# Patient Record
Sex: MALE | Race: WHITE | NOT HISPANIC OR LATINO | Employment: FULL TIME | URBAN - METROPOLITAN AREA
[De-identification: names, ages, dates, MRNs, and addresses within clinical notes are randomized per-mention and may not be internally consistent; named-entity substitution may affect disease eponyms.]

---

## 2018-01-13 NOTE — RESULT NOTES
Message  Stool test came back as negative      LMOM for pt to call the office for results  thanks CF1    LMOM x2 for pt to call the office  thanks CF2    pt aware of results   thanks CF3        1 Amended By: Tavon Darling; Jan 21 2016 3:14 PM EST   2 Amended By: Tavon Darling; Jan 22 2016 12:48 PM EST   3 Amended By: Tavon Darling; Jan 26 2016 8:03 AM EST    Signatures  Electronically signed by : Marylene Alstrom, M D ; Jan 21 2016  3:11PM EST                       (Author)

## 2018-05-22 ENCOUNTER — APPOINTMENT (EMERGENCY)
Dept: RADIOLOGY | Facility: HOSPITAL | Age: 28
End: 2018-05-22
Payer: COMMERCIAL

## 2018-05-22 ENCOUNTER — APPOINTMENT (EMERGENCY)
Dept: CT IMAGING | Facility: HOSPITAL | Age: 28
End: 2018-05-22
Payer: COMMERCIAL

## 2018-05-22 ENCOUNTER — HOSPITAL ENCOUNTER (EMERGENCY)
Facility: HOSPITAL | Age: 28
Discharge: HOME | End: 2018-05-22
Attending: EMERGENCY MEDICINE | Admitting: EMERGENCY MEDICINE
Payer: COMMERCIAL

## 2018-05-22 VITALS
RESPIRATION RATE: 18 BRPM | WEIGHT: 224.21 LBS | OXYGEN SATURATION: 98 % | HEIGHT: 72 IN | SYSTOLIC BLOOD PRESSURE: 109 MMHG | HEART RATE: 70 BPM | BODY MASS INDEX: 30.37 KG/M2 | TEMPERATURE: 98.6 F | DIASTOLIC BLOOD PRESSURE: 71 MMHG

## 2018-05-22 DIAGNOSIS — R20.2 PARESTHESIA: ICD-10-CM

## 2018-05-22 DIAGNOSIS — F41.9 ANXIETY: ICD-10-CM

## 2018-05-22 DIAGNOSIS — R07.9 CHEST PAIN: Primary | ICD-10-CM

## 2018-05-22 DIAGNOSIS — R79.89 ELEVATED TSH: ICD-10-CM

## 2018-05-22 LAB
ALBUMIN SERPL BCP-MCNC: 4.2 G/DL (ref 3.5–5)
ALP SERPL-CCNC: 81 U/L (ref 46–116)
ALT SERPL W P-5'-P-CCNC: 48 U/L (ref 12–78)
ANION GAP SERPL CALCULATED.3IONS-SCNC: 11 MMOL/L (ref 4–13)
AST SERPL W P-5'-P-CCNC: 22 U/L (ref 5–45)
ATRIAL RATE: 64 BPM
ATRIAL RATE: 87 BPM
BASOPHILS # BLD AUTO: 0.03 THOUSANDS/ΜL (ref 0–0.1)
BASOPHILS NFR BLD AUTO: 0 % (ref 0–1)
BILIRUB SERPL-MCNC: 0.5 MG/DL (ref 0.2–1)
BUN SERPL-MCNC: 13 MG/DL (ref 5–25)
CALCIUM SERPL-MCNC: 9 MG/DL (ref 8.3–10.1)
CHLORIDE SERPL-SCNC: 102 MMOL/L (ref 100–108)
CK SERPL-CCNC: 103 U/L (ref 39–308)
CO2 SERPL-SCNC: 28 MMOL/L (ref 21–32)
CREAT SERPL-MCNC: 0.92 MG/DL (ref 0.6–1.3)
DEPRECATED D DIMER PPP: <270 NG/ML (FEU) (ref 0–424)
EOSINOPHIL # BLD AUTO: 0.07 THOUSAND/ΜL (ref 0–0.61)
EOSINOPHIL NFR BLD AUTO: 1 % (ref 0–6)
ERYTHROCYTE [DISTWIDTH] IN BLOOD BY AUTOMATED COUNT: 12.6 % (ref 11.6–15.1)
GFR SERPL CREATININE-BSD FRML MDRD: 112 ML/MIN/1.73SQ M
GLUCOSE SERPL-MCNC: 98 MG/DL (ref 65–140)
HCT VFR BLD AUTO: 43.9 % (ref 36.5–49.3)
HGB BLD-MCNC: 15.2 G/DL (ref 12–17)
LYMPHOCYTES # BLD AUTO: 2.74 THOUSANDS/ΜL (ref 0.6–4.47)
LYMPHOCYTES NFR BLD AUTO: 33 % (ref 14–44)
MCH RBC QN AUTO: 29.6 PG (ref 26.8–34.3)
MCHC RBC AUTO-ENTMCNC: 34.6 G/DL (ref 31.4–37.4)
MCV RBC AUTO: 86 FL (ref 82–98)
MONOCYTES # BLD AUTO: 0.54 THOUSAND/ΜL (ref 0.17–1.22)
MONOCYTES NFR BLD AUTO: 7 % (ref 4–12)
NEUTROPHILS # BLD AUTO: 4.95 THOUSANDS/ΜL (ref 1.85–7.62)
NEUTS SEG NFR BLD AUTO: 59 % (ref 43–75)
P AXIS: 66 DEGREES
P AXIS: 72 DEGREES
PLATELET # BLD AUTO: 300 THOUSANDS/UL (ref 149–390)
PMV BLD AUTO: 9.1 FL (ref 8.9–12.7)
POTASSIUM SERPL-SCNC: 3.5 MMOL/L (ref 3.5–5.3)
PR INTERVAL: 170 MS
PR INTERVAL: 186 MS
PROT SERPL-MCNC: 8 G/DL (ref 6.4–8.2)
QRS AXIS: 106 DEGREES
QRS AXIS: 109 DEGREES
QRSD INTERVAL: 94 MS
QRSD INTERVAL: 96 MS
QT INTERVAL: 354 MS
QT INTERVAL: 398 MS
QTC INTERVAL: 401 MS
QTC INTERVAL: 409 MS
RBC # BLD AUTO: 5.13 MILLION/UL (ref 3.88–5.62)
SODIUM SERPL-SCNC: 141 MMOL/L (ref 136–145)
T WAVE AXIS: 56 DEGREES
T WAVE AXIS: 59 DEGREES
TROPONIN I SERPL-MCNC: <0.02 NG/ML
TROPONIN I SERPL-MCNC: <0.02 NG/ML
TSH SERPL DL<=0.05 MIU/L-ACNC: 7.78 UIU/ML (ref 0.36–3.74)
VENTRICULAR RATE: 61 BPM
VENTRICULAR RATE: 80 BPM
WBC # BLD AUTO: 8.33 THOUSAND/UL (ref 4.31–10.16)

## 2018-05-22 PROCEDURE — 84443 ASSAY THYROID STIM HORMONE: CPT | Performed by: EMERGENCY MEDICINE

## 2018-05-22 PROCEDURE — 85379 FIBRIN DEGRADATION QUANT: CPT | Performed by: EMERGENCY MEDICINE

## 2018-05-22 PROCEDURE — 36415 COLL VENOUS BLD VENIPUNCTURE: CPT | Performed by: EMERGENCY MEDICINE

## 2018-05-22 PROCEDURE — 96374 THER/PROPH/DIAG INJ IV PUSH: CPT

## 2018-05-22 PROCEDURE — 84484 ASSAY OF TROPONIN QUANT: CPT | Performed by: EMERGENCY MEDICINE

## 2018-05-22 PROCEDURE — 70450 CT HEAD/BRAIN W/O DYE: CPT

## 2018-05-22 PROCEDURE — 93005 ELECTROCARDIOGRAM TRACING: CPT

## 2018-05-22 PROCEDURE — 82550 ASSAY OF CK (CPK): CPT | Performed by: EMERGENCY MEDICINE

## 2018-05-22 PROCEDURE — 99285 EMERGENCY DEPT VISIT HI MDM: CPT

## 2018-05-22 PROCEDURE — 80053 COMPREHEN METABOLIC PANEL: CPT | Performed by: EMERGENCY MEDICINE

## 2018-05-22 PROCEDURE — 85025 COMPLETE CBC W/AUTO DIFF WBC: CPT | Performed by: EMERGENCY MEDICINE

## 2018-05-22 PROCEDURE — 71046 X-RAY EXAM CHEST 2 VIEWS: CPT

## 2018-05-22 PROCEDURE — 93010 ELECTROCARDIOGRAM REPORT: CPT | Performed by: INTERNAL MEDICINE

## 2018-05-22 RX ORDER — ONDANSETRON 2 MG/ML
4 INJECTION INTRAMUSCULAR; INTRAVENOUS ONCE
Status: DISCONTINUED | OUTPATIENT
Start: 2018-05-22 | End: 2018-05-22 | Stop reason: HOSPADM

## 2018-05-22 RX ORDER — LORAZEPAM 2 MG/ML
0.5 INJECTION INTRAMUSCULAR ONCE
Status: DISCONTINUED | OUTPATIENT
Start: 2018-05-22 | End: 2018-05-22

## 2018-05-22 RX ORDER — KETOROLAC TROMETHAMINE 30 MG/ML
30 INJECTION, SOLUTION INTRAMUSCULAR; INTRAVENOUS ONCE
Status: COMPLETED | OUTPATIENT
Start: 2018-05-22 | End: 2018-05-22

## 2018-05-22 RX ORDER — ASPIRIN 81 MG/1
162 TABLET, CHEWABLE ORAL ONCE
Status: COMPLETED | OUTPATIENT
Start: 2018-05-22 | End: 2018-05-22

## 2018-05-22 RX ADMIN — KETOROLAC TROMETHAMINE 30 MG: 30 INJECTION, SOLUTION INTRAMUSCULAR at 02:06

## 2018-05-22 RX ADMIN — ASPIRIN 81 MG 162 MG: 81 TABLET ORAL at 02:06

## 2018-05-22 NOTE — ED NOTES
Reviewed discharge instructions, f/u apt information and indication to return to emergency care  Patient verbalized understanding and offered no further questions  Alert and in stable condition  Ambulated off unit accompanied by friend with steady gait and in no acute distress        Dominique Reddy RN  05/22/18 [id number]

## 2018-05-22 NOTE — DISCHARGE INSTRUCTIONS
Anxiety   WHAT YOU NEED TO KNOW:   Anxiety is a condition that causes you to feel extremely worried or nervous  The feelings are so strong that they can cause problems with your daily activities or sleep  Anxiety may be triggered by something you fear, or it may happen without a cause  Family or work stress, smoking, caffeine, and alcohol can increase your risk for anxiety  Certain medicines or health conditions can also increase your risk  Anxiety can become a long-term condition if it is not managed or treated  DISCHARGE INSTRUCTIONS:   Call 911 if:   · You have chest pain, tightness, or heaviness that may spread to your shoulders, arms, jaw, neck, or back  · You feel like hurting yourself or someone else  Contact your healthcare provider if:   · Your symptoms get worse or do not get better with treatment  · Your anxiety keeps you from doing your regular daily activities  · You have new symptoms since your last visit  · You have questions or concerns about your condition or care  Medicines:   · Medicines  may be given to help you feel more calm and relaxed, and decrease your symptoms  · Take your medicine as directed  Contact your healthcare provider if you think your medicine is not helping or if you have side effects  Tell him of her if you are allergic to any medicine  Keep a list of the medicines, vitamins, and herbs you take  Include the amounts, and when and why you take them  Bring the list or the pill bottles to follow-up visits  Carry your medicine list with you in case of an emergency  Follow up with your healthcare provider within 2 weeks or as directed:  Write down your questions so you remember to ask them during your visits  Manage anxiety:   · Talk to someone about your anxiety  Your healthcare provider may suggest counseling  Cognitive behavioral therapy can help you understand and change how you react to events that trigger your symptoms   You might feel more comfortable talking with a friend or family member about your anxiety  Choose someone you know will be supportive and encouraging  · Find ways to relax  Activities such as exercise, meditation, or listening to music can help you relax  Spend time with friends, or do things you enjoy  · Practice deep breathing  Deep breathing can help you relax when you feel anxious  Focus on taking slow, deep breaths several times a day, or during an anxiety attack  Breathe in through your nose and out through your mouth  · Create a regular sleep routine  Regular sleep can help you feel calmer during the day  Go to sleep and wake up at the same times every day  Do not watch television or use the computer right before bed  Your room should be comfortable, dark, and quiet  · Eat a variety of healthy foods  Healthy foods include fruits, vegetables, low-fat dairy products, lean meats, fish, whole-grain breads, and cooked beans  Healthy foods can help you feel less anxious and have more energy  · Exercise regularly  Exercise can increase your energy level  Exercise may also lift your mood and help you sleep better  Your healthcare provider can help you create an exercise plan  · Do not smoke  Nicotine and other chemicals in cigarettes and cigars can increase anxiety  Ask your healthcare provider for information if you currently smoke and need help to quit  E-cigarettes or smokeless tobacco still contain nicotine  Talk to your healthcare provider before you use these products  · Do not have caffeine  Caffeine can make your symptoms worse  Do not have foods or drinks that are meant to increase your energy level  · Limit or do not drink alcohol  Ask your healthcare provider if alcohol is safe for you  You may not be able to drink alcohol if you take certain anxiety or depression medicines  Limit alcohol to 1 drink per day if you are a woman  Limit alcohol to 2 drinks per day if you are a man   A drink of alcohol is 12 ounces of beer, 5 ounces of wine, or 1½ ounces of liquor  · Do not use drugs  Drugs can make your anxiety worse  It can also make anxiety hard to manage  Talk to your healthcare provider if you use drugs and want help to quit  © 2017 [employer] Information is for End User's use only and may not be sold, redistributed or otherwise used for commercial purposes  All illustrations and images included in CareNotes® are the copyrighted property of [employer]  or [employer]  The above information is an  only  It is not intended as medical advice for individual conditions or treatments  Talk to your doctor, nurse or pharmacist before following any medical regimen to see if it is safe and effective for you  Chest Pain   WHAT YOU NEED TO KNOW:   Chest pain can be caused by a range of conditions, from not serious to life-threatening  Chest pain can be a symptom of a digestive problem, such as acid reflux or a stomach ulcer  An anxiety attack or a strong emotion, such as anger, can also cause chest pain  Infection, inflammation, or a fracture in the bones or cartilage in your chest can cause pain or discomfort  Sometimes chest pain or pressure is caused by poor blood flow to your heart (angina)  Chest pain may also be caused by life-threatening conditions such as a heart attack or blood clot in your lungs  DISCHARGE INSTRUCTIONS:   Call 911 if:   · You have any of the following signs of a heart attack:      ¨ Squeezing, pressure, or pain in your chest that lasts longer than 5 minutes or returns    ¨ Discomfort or pain in your back, neck, jaw, stomach, or arm     ¨ Trouble breathing    ¨ Nausea or vomiting    ¨ Lightheadedness or a sudden cold sweat, especially with chest pain or trouble breathing    Return to the emergency department if:   · You have chest discomfort that gets worse, even with medicine  · You cough or vomit blood       · Your bowel movements are black or bloody  · You cannot stop vomiting, or it hurts to swallow  Contact your healthcare provider if:   · You have questions or concerns about your condition or care  Medicines:   · Medicines  may be given to treat the cause of your chest pain  Examples include pain medicine, anxiety medicine, or medicines to increase blood flow to your heart  · Do not take certain medicines without asking your healthcare provider first   These include NSAIDs, herbal or vitamin supplements, or hormones (estrogen or progestin)  · Take your medicine as directed  Contact your healthcare provider if you think your medicine is not helping or if you have side effects  Tell him or her if you are allergic to any medicine  Keep a list of the medicines, vitamins, and herbs you take  Include the amounts, and when and why you take them  Bring the list or the pill bottles to follow-up visits  Carry your medicine list with you in case of an emergency  Follow up with your healthcare provider within 72 hours, or as directed: You may need to return for more tests to find the cause of your chest pain  You may be referred to a specialist, such as a cardiologist or gastroenterologist  Write down your questions so you remember to ask them during your visits  Healthy living tips: The following are general healthy guidelines  If your chest pain is caused by a heart problem, your healthcare provider will give you specific guidelines to follow  · Do not smoke  Nicotine and other chemicals in cigarettes and cigars can cause lung and heart damage  Ask your healthcare provider for information if you currently smoke and need help to quit  E-cigarettes or smokeless tobacco still contain nicotine  Talk to your healthcare provider before you use these products  · Eat a variety of healthy, low-fat foods  Healthy foods include fruits, vegetables, whole-grain breads, low-fat dairy products, beans, lean meats, and fish   Ask for more information about a heart healthy diet  · Ask about activity  Your healthcare provider will tell you which activities to limit or avoid  Ask when you can drive, return to work, and have sex  Ask about the best exercise plan for you  · Maintain a healthy weight  Ask your healthcare provider how much you should weigh  Ask him or her to help you create a weight loss plan if you are overweight  · Get the flu and pneumonia vaccines  All adults should get the influenza (flu) vaccine  Get it every year as soon as it becomes available  The pneumococcal vaccine is given to adults aged 72 years or older  The vaccine is given every 5 years to prevent pneumococcal disease, such as pneumonia  © 2017 [employer]  Information is for End User's use only and may not be sold, redistributed or otherwise used for commercial purposes  All illustrations and images included in CareNotes® are the copyrighted property of [employer]  or [employer]  The above information is an  only  It is not intended as medical advice for individual conditions or treatments  Talk to your doctor, nurse or pharmacist before following any medical regimen to see if it is safe and effective for you  Paresthesia   WHAT YOU NEED TO KNOW:   Paresthesia is numbness and tingling  It can happen in any part of your body, but usually occurs in your legs, feet, arms, or hands  There are a large number of conditions that can cause paresthesia  It affects the nerves that provide sensation and happens when there are changes in nerves or nerve pathways  These changes can be temporary, such as if you take certain medicines or you are not getting enough vitamin B  Paresthesia can become permanent when there is nerve damage  Conditions that may cause nerve damage include diabetes, carpel tunnel syndrome, stroke, and multiple sclerosis  The exact cause of your paresthesia may be unknown    DISCHARGE INSTRUCTIONS: Medicines:  Ask for more information about medicines you may need to treat the condition causing your paresthesias  · NSAIDs  help decrease swelling and pain or fever  This medicine is available with or without a doctor's order  NSAIDs can cause stomach bleeding or kidney problems in certain people  If you take blood thinner medicine, always ask your healthcare provider if NSAIDs are safe for you  Always read the medicine label and follow directions  · Take your medicine as directed  Contact your healthcare provider if you think your medicine is not helping or if you have side effects  Tell him or her if you are allergic to any medicine  Keep a list of the medicines, vitamins, and herbs you take  Include the amounts, and when and why you take them  Bring the list or the pill bottles to follow-up visits  Carry your medicine list with you in case of an emergency  Follow up with your healthcare provider or neurologist as directed:  Write down your questions so you remember to ask them during your visits  Contact your healthcare provider or neurologist if:   · Your symptoms do not improve  · You have symptoms in more than one part of your body  · You have questions about your condition or care  Return to the emergency department if:   · You have any of the following signs of a stroke:      ¨ Numbness or drooping on one side of your face     ¨ Weakness in an arm or leg    ¨ Confusion or difficulty speaking    ¨ Dizziness, a severe headache, or vision loss    · You are not able to control your urine or bowel movements  · You have severe pain along with numbness and tingling  · Your legs suddenly become cold  You have trouble moving your legs, and they ache  · You have increased weakness in a part of your body  · You have uncontrolled movements, or you have a seizure    © 2017 [employer] Information is for End User's use only and may not be sold, redistributed or otherwise used for commercial purposes  All illustrations and images included in CareNotes® are the copyrighted property of [employer]  or [employer]  The above information is an  only  It is not intended as medical advice for individual conditions or treatments  Talk to your doctor, nurse or pharmacist before following any medical regimen to see if it is safe and effective for you

## 2018-05-22 NOTE — ED PROVIDER NOTES
History  Chief Complaint   Patient presents with    Chest Pain     Patient reports hx chest pain "for 2 years  " with hx  anxiety  Over last 4 days reports new onset left arm numbness and periodic right sided neck pain/numbness  Chest pain at present over left upper chest radiating down left arm  Reports mild SOB, not worsening with exertion  Patient is a 32year old male with 4 days of intermittent worsening left sided chest pain with radiation to left arm  (+) headache and neck pain with numbness of left arm and right side of neck intermittently  Has anxiety  (+) nausea and no vomiting  (+) mild sob  Not pleuritic  Has a 3 hour car ride every month to go camping  No early CAD in family  Has smoked on and off for past 10 years and quit 4 months ago  Denies illicit drug use including cocaine  No recent old records from this ED seen on computer system  [facility] website checked on this patient and last Rxs filled were on 4/28/18 for adderall ER and valium both for 30 day supply  (+) dizziness  No vertigo  Patient states he is worried about a stroke  No cough  Has had intermittent chest pain for past 2 years from anxiety  Patient drove here but friend can drive home  History provided by:  Patient and friend   used: No        None       Past Medical History:   Diagnosis Date    Anxiety        Past Surgical History:   Procedure Laterality Date    COLONOSCOPY N/A 3/18/2016    Procedure: COLONOSCOPY;  Surgeon: Ofelia Ramires MD;  Location: [facility] GI LAB; Service:     ESOPHAGOGASTRODUODENOSCOPY N/A 3/18/2016    Procedure: ESOPHAGOGASTRODUODENOSCOPY (EGD); Surgeon: Ofelia Ramires MD;  Location: [facility] GI LAB; Service:        History reviewed  No pertinent family history  I have reviewed and agree with the history as documented      Social History   Substance Use Topics    Smoking status: Former Smoker     Packs/day: 0 50     Quit date: 3/18/2012    Smokeless tobacco: Never Used  Alcohol use 0 6 oz/week     1 Cans of beer per week        Review of Systems   Constitutional: Negative for fever  Respiratory: Positive for shortness of breath  Negative for cough  Cardiovascular: Positive for chest pain  Gastrointestinal: Positive for nausea  Negative for vomiting  Musculoskeletal: Positive for myalgias and neck pain  Neurological: Positive for dizziness, numbness and headaches  Psychiatric/Behavioral: The patient is nervous/anxious  All other systems reviewed and are negative  Physical Exam  Physical Exam   Constitutional: He is oriented to person, place, and time  He appears well-developed and well-nourished  He appears distressed (moderate)  HENT:   Head: Normocephalic and atraumatic  Mouth/Throat: Oropharynx is clear and moist    Eyes: EOM are normal  Pupils are equal, round, and reactive to light  No scleral icterus  Neck: Normal range of motion  Neck supple  No JVD present  No tracheal deviation present  Cardiovascular: Normal rate, regular rhythm and normal heart sounds  No murmur heard  Pulmonary/Chest: Effort normal and breath sounds normal  No stridor  No respiratory distress  He has no wheezes  He has no rales  He exhibits no tenderness  Abdominal: Soft  Bowel sounds are normal  He exhibits no distension  There is no tenderness  Musculoskeletal: He exhibits no edema, tenderness (No calf tenderness) or deformity  Neurological: He is alert and oriented to person, place, and time  No cranial nerve deficit or sensory deficit  Normal strength  No focal deficits   Skin: Skin is warm and dry  No rash noted  Psychiatric:   Anxious  Nursing note and vitals reviewed        Vital Signs  ED Triage Vitals   Temperature Pulse Respirations Blood Pressure SpO2   05/22/18 0115 05/22/18 0045 05/22/18 0045 05/22/18 0045 05/22/18 0045   98 6 °F (37 °C) 82 20 114/77 100 %      Temp Source Heart Rate Source Patient Position - Orthostatic VS BP Location FiO2 (%)   05/22/18 0115 05/22/18 0145 05/22/18 0045 05/22/18 0045 --   Oral Monitor Sitting Right arm       Pain Score       05/22/18 0045       4           Vitals:    05/22/18 0045 05/22/18 0145 05/22/18 0330   BP: 114/77 110/75 108/66   Pulse: 82 74 62   Patient Position - Orthostatic VS: Sitting Sitting Sitting       Visual Acuity      ED Medications  Medications   ondansetron (ZOFRAN) injection 4 mg (4 mg Intravenous Not Given 5/22/18 0115)   aspirin chewable tablet 162 mg (162 mg Oral Given 5/22/18 0206)   ketorolac (TORADOL) injection 30 mg (30 mg Intravenous Given 5/22/18 0206)       Diagnostic Studies  Results Reviewed     Procedure Component Value Units Date/Time    Troponin I [[id number]]  (Normal) Collected:  05/22/18 0307    Lab Status:  Final result Specimen:  Blood from Arm, Right Updated:  05/22/18 0332     Troponin I <0 02 ng/mL     Narrative:         Siemens Chemistry analyzer 99% cutoff is > 0 04 ng/mL in network labs    o cTnI 99% cutoff is useful only when applied to patients in the clinical setting of myocardial ischemia  o cTnI 99% cutoff should be interpreted in the context of clinical history, ECG findings and possibly cardiac imaging to establish correct diagnosis  o cTnI 99% cutoff may be suggestive but clearly not indicative of a coronary event without the clinical setting of myocardial ischemia  TSH [[id number]]  (Abnormal) Collected:  05/22/18 0104    Lab Status:  Final result Specimen:  Blood from Arm, Right Updated:  05/22/18 0135     TSH 3RD GENERATON 7 775 (H) uIU/mL     Narrative:         Patients undergoing fluorescein dye angiography may retain small amounts of fluorescein in the body for 48-72 hours post procedure  Samples containing fluorescein can produce falsely depressed TSH values  If the patient had this procedure,a specimen should be resubmitted post fluorescein clearance      CK Total with Reflex CKMB [[id number]]  (Normal) Collected:  05/22/18 0104    Lab Status:  Final result Specimen:  Blood from Arm, Right Updated:  05/22/18 0135     Total  U/L     Troponin I [[id number]]  (Normal) Collected:  05/22/18 0104    Lab Status:  Final result Specimen:  Blood from Arm, Right Updated:  05/22/18 0128     Troponin I <0 02 ng/mL     Narrative:         Siemens Chemistry analyzer 99% cutoff is > 0 04 ng/mL in network labs    o cTnI 99% cutoff is useful only when applied to patients in the clinical setting of myocardial ischemia  o cTnI 99% cutoff should be interpreted in the context of clinical history, ECG findings and possibly cardiac imaging to establish correct diagnosis  o cTnI 99% cutoff may be suggestive but clearly not indicative of a coronary event without the clinical setting of myocardial ischemia  Comprehensive metabolic panel [[id number]] Collected:  05/22/18 0104    Lab Status:  Final result Specimen:  Blood from Arm, Right Updated:  05/22/18 0126     Sodium 141 mmol/L      Potassium 3 5 mmol/L      Chloride 102 mmol/L      CO2 28 mmol/L      Anion Gap 11 mmol/L      BUN 13 mg/dL      Creatinine 0 92 mg/dL      Glucose 98 mg/dL      Calcium 9 0 mg/dL      AST 22 U/L      ALT 48 U/L      Alkaline Phosphatase 81 U/L      Total Protein 8 0 g/dL      Albumin 4 2 g/dL      Total Bilirubin 0 50 mg/dL      eGFR 112 ml/min/1 73sq m     Narrative:         National Kidney Disease Education Program recommendations are as follows:  GFR calculation is accurate only with a steady state creatinine  Chronic Kidney disease less than 60 ml/min/1 73 sq  meters  Kidney failure less than 15 ml/min/1 73 sq  meters      D-Dimer [[id number]]  (Normal) Collected:  05/22/18 0104    Lab Status:  Final result Specimen:  Blood from Arm, Right Updated:  05/22/18 0124     D-Dimer, Quant <270 ng/ml (FEU)     CBC and differential [[id number]]  (Normal) Collected:  05/22/18 0104    Lab Status:  Final result Specimen:  Blood from Arm, Right Updated:  05/22/18 0109     WBC 8 33 Thousand/uL      RBC 5 13 Million/uL Hemoglobin 15 2 g/dL      Hematocrit 43 9 %      MCV 86 fL      MCH 29 6 pg      MCHC 34 6 g/dL      RDW 12 6 %      MPV 9 1 fL      Platelets 780 Thousands/uL      Neutrophils Relative 59 %      Lymphocytes Relative 33 %      Monocytes Relative 7 %      Eosinophils Relative 1 %      Basophils Relative 0 %      Neutrophils Absolute 4 95 Thousands/µL      Lymphocytes Absolute 2 74 Thousands/µL      Monocytes Absolute 0 54 Thousand/µL      Eosinophils Absolute 0 07 Thousand/µL      Basophils Absolute 0 03 Thousands/µL                  CT head without contrast   ED Interpretation by Xena Hansen MD ( [id number])   COMPARISON:   No relevant prior studies available  FINDINGS:   Brain: Unremarkable  No hemorrhage  No significant white matter disease  No edema  Ventricles: Unremarkable  No ventriculomegaly  Bones/joints: Unremarkable  No acute fracture  Soft tissues: Unremarkable  Sinuses: Unremarkable as visualized  No acute sinusitis  Mastoid air cells: Unremarkable as visualized  No mastoid effusion  IMPRESSION:   Normal head/brain CT  Thank you for allowing us to participate in the care of your patient  Dictated and Authenticated by: Kirit Benavidez MD   2018 1:44 AM Melisa Naman Time ([address] 1838)      XR chest 2 views   ED Interpretation by Xena Hansen MD ( [id number])   No acute disease read by me                    Procedures  ECG 12 Lead Documentation  Date/Time: 2018 12:49 AM  Performed by: Jose Juan Jenkins  Authorized by: Jose Juan Jenkins     Indications / Diagnosis:  Chest pain  ECG reviewed by me, the ED Provider: yes    Patient location:  ED  Previous ECG:     Previous ECG:  Unavailable  Rate:     ECG rate:  80    ECG rate assessment: normal    Rhythm:     Rhythm: sinus rhythm    Ectopy:     Ectopy: none    QRS:     QRS axis:  Right  Conduction:     Conduction: normal    ST segments:     ST segments:  Normal  T waves:     T waves: normal      ECG 12 Lead Documentation  Date/Time: 5/22/2018 3:11 AM  Performed by: Ciera Conteh  Authorized by: Ciera Conteh     Indications / Diagnosis:  Repeat EKG for chest pain  ECG reviewed by me, the ED Provider: yes    Patient location:  ED  Previous ECG:     Previous ECG:  Compared to current    Comparison ECG info:  From earlier tonight    Similarity:  No change  Rate:     ECG rate:  61    ECG rate assessment: normal    Rhythm:     Rhythm: sinus rhythm    Ectopy:     Ectopy: none    QRS:     QRS axis:  Right  Conduction:     Conduction: normal    ST segments:     ST segments:  Normal  T waves:     T waves: normal             Phone Contacts  ED Phone Contact    ED Course  ED Course as of May 22 0344   Tue May 22, 2018   [address] ED RN states that friend cannot drive home so ativan cancelled  CXR d/w patient  [id number] Labs d/w patient and need for further outpatient workup for elevated TSH      0158 CT d/w patient  IV toradol and ASA ordered  Will recheck EKG and troponin at 0300 and if okay will discharge home  [id number] Patient resting comfortably prior to discharge             HEART Risk Score      Most Recent Value   History  0 Filed at: 05/22/2018 0130   ECG  0 Filed at: 05/22/2018 0130   Age  0 Filed at: 05/22/2018 0130   Risk Factors  1 Filed at: 05/22/2018 0130   Troponin  0 Filed at: 05/22/2018 0130   Heart Score Risk Calculator   History  0 Filed at: 05/22/2018 0130   ECG  0 Filed at: 05/22/2018 0130   Age  0 Filed at: 05/22/2018 0130   Risk Factors  1 Filed at: 05/22/2018 0130   Troponin  0 Filed at: 05/22/2018 0130   HEART Score  1 Filed at: 05/22/2018 0130   HEART Score  1 Filed at: 05/22/2018 0130                    SHAYLA Risk Score      Most Recent Value   Age >= 72  0 Filed at: 05/22/2018 0131   Known CAD (stenosis >= 50%)  0 Filed at: 05/22/2018 0131   Recent (<=24 hrs) Service Angina  0 Filed at: 05/22/2018 0131   ST Deviation >= 0 5 mm  0 Filed at: 05/22/2018 0131   3+ CAD Risk Factors (FHx, HTN, HLP, DM, Smoker)  0 Filed at: 05/22/2018 0131   Aspirin Use Past 7 Days  0 Filed at: 05/22/2018 0131   Elevated Cardiac Markers  0 Filed at: 05/22/2018 0131   SHAYLA Risk Score (Calculated)  0 Filed at: 05/22/2018 0131              MDM  Number of Diagnoses or Management Options  Diagnosis management comments: DDX including but not limited to: chest wall pain, pleurisy, costochondritis, pericarditis, myocarditis, PTX, pneumonia, GI etiology, anxiety, metabolic abnormality, thyroid disease, intracranial process including CVA (doubt); doubt ACS or MI or dissection or PE or rhabdomyolysis  Amount and/or Complexity of Data Reviewed  Clinical lab tests: ordered and reviewed  Tests in the radiology section of CPT®: ordered and reviewed  Decide to obtain previous medical records or to obtain history from someone other than the patient: yes  Independent visualization of images, tracings, or specimens: yes      CritCare Time    Disposition  Final diagnoses:   Chest pain   Paresthesia   Elevated TSH   Anxiety     Time reflects when diagnosis was documented in both MDM as applicable and the Disposition within this note     Time User Action Codes Description Comment    5/22/2018  2:02 AM Dany Creola Add [R07 9] Chest pain     5/22/2018  2:02 AM Dany Groom Add [R20 2] Paresthesia     5/22/2018  2:03 AM Dany Groom Add [R94 6] Elevated TSH     5/22/2018  2:03 AM Dany Groom Add [F41 9] Anxiety       ED Disposition     ED Disposition Condition Comment    Discharge  Dusofia Bookbinder discharge to home/self care  Condition at discharge: Stable        Follow-up Information     Follow up With Specialties Details Why Vladimir Thornton  Call in 1 day for further outpatient workup of elevated thyroid stimulating hormone level  Follow up with therapist as well  Return sooner if increased pain, fever, vomiting, weakness, difficulty breathing  motrin/tylenol for pain    [phone]            Patient's Medications    No medications on file     No discharge procedures on file      ED Provider  Electronically Signed by           Meg Mathews MD  05/22/18 5164

## 2018-07-03 ENCOUNTER — OFFICE VISIT (OUTPATIENT)
Dept: ENDOCRINOLOGY | Facility: CLINIC | Age: 28
End: 2018-07-03
Payer: COMMERCIAL

## 2018-07-03 VITALS
BODY MASS INDEX: 27.83 KG/M2 | DIASTOLIC BLOOD PRESSURE: 72 MMHG | SYSTOLIC BLOOD PRESSURE: 120 MMHG | WEIGHT: 205.5 LBS | HEART RATE: 113 BPM | HEIGHT: 72 IN

## 2018-07-03 DIAGNOSIS — R79.89 ELEVATED TSH: Primary | ICD-10-CM

## 2018-07-03 DIAGNOSIS — R00.0 TACHYCARDIA: ICD-10-CM

## 2018-07-03 DIAGNOSIS — F41.9 ANXIETY: ICD-10-CM

## 2018-07-03 PROCEDURE — 99204 OFFICE O/P NEW MOD 45 MIN: CPT | Performed by: INTERNAL MEDICINE

## 2018-07-03 NOTE — PROGRESS NOTES
Amber Ibarra 32 y o  male MRN: [id number]    Encounter: [id number]      Assessment/Plan     Problem List Items Addressed This Visit     Elevated TSH - Primary     Symptoms do not seen to be related to thyroid dysfunction - for now will repeat tsh and also check free t4 ,TPO and TG AB   Further workup and management will depend on the labs          Relevant Orders    T4, free Clinic Collect    Thyroid Antibodies Panel Clinic Collect    TSH, 3rd generation Clinic Collect    Tachycardia    Relevant Orders    T4, free Clinic Collect    TSH, 3rd generation Clinic Collect    Anxiety        CC:   Elevated tsh     History of Present Illness     HPI: 33 y/o gentleman referred here for evaluation of elevated TSH   He was initially Seen  In ED for chest pain and arm numbness - during workup had labs done which showed elevated TSH  Anxiety - chronic - stable   Weight - gradual weight gain - few lbs per year   diarrhea - twice a week  Occasional palpitations ,  Sleep disturbances - chronic   C/o fatigue - chronic       Review of Systems   Constitutional: Positive for fatigue  Negative for unexpected weight change  HENT: Negative for trouble swallowing  Eyes: Negative for visual disturbance  Respiratory: Negative for cough and shortness of breath  Cardiovascular: Negative for chest pain, palpitations and leg swelling  Gastrointestinal: Positive for diarrhea and nausea  Negative for constipation and vomiting  Endocrine: Positive for cold intolerance  Negative for heat intolerance  Musculoskeletal: Negative for gait problem  Skin: Negative for color change, pallor and rash  Psychiatric/Behavioral: Positive for sleep disturbance  All other systems reviewed and are negative        Historical Information   Past Medical History:   Diagnosis Date    Anxiety      Past Surgical History:   Procedure Laterality Date    COLONOSCOPY N/A 3/18/2016    Procedure: COLONOSCOPY;  Surgeon: Yady Perez MD; Location: [facility] GI LAB; Service:     ESOPHAGOGASTRODUODENOSCOPY N/A 3/18/2016    Procedure: ESOPHAGOGASTRODUODENOSCOPY (EGD); Surgeon: Marylene Alstrom, MD;  Location[facility] GI LAB; Service:      Social History   History   Alcohol Use    0 6 oz/week    1 Cans of beer per week     History   Drug Use No     History   Smoking Status    Former Smoker    Packs/day: 0 50    Quit date: 3/18/2012   Smokeless Tobacco    Never Used     Family History:   Family History   Problem Relation Age of Onset    Hypertension Mother        Meds/Allergies   No current outpatient prescriptions on file  No current facility-administered medications for this visit  No Known Allergies    Objective   Vitals: Blood pressure 120/72, pulse (!) 113, height 5' 11 75" (1 822 m), weight 93 2 kg (205 lb 8 oz)  Physical Exam   Constitutional: He is oriented to person, place, and time  He appears well-developed and well-nourished  No distress  HENT:   Head: Normocephalic and atraumatic  Mouth/Throat: No oropharyngeal exudate  Eyes: Conjunctivae and EOM are normal  No scleral icterus  Neck: Normal range of motion  Neck supple  No thyromegaly present  Cardiovascular: Normal rate, regular rhythm and normal heart sounds  No murmur heard  Pulmonary/Chest: Effort normal and breath sounds normal  No respiratory distress  He has no wheezes  He has no rales  Abdominal: Soft  Bowel sounds are normal  He exhibits no distension  There is no tenderness  There is no rebound  Musculoskeletal: Normal range of motion  He exhibits no edema or deformity  Lymphadenopathy:     He has no cervical adenopathy  Neurological: He is alert and oriented to person, place, and time  Skin: Skin is warm and dry  No rash noted  No erythema  No pallor  Psychiatric: He has a normal mood and affect  His behavior is normal  Judgment and thought content normal        The history was obtained from the review of the chart, patient      Lab Results:   Lab Results   Component Value Date/Time    TSH 3RD [facility] 7 775 (H) 05/22/2018 01:04 AM           Portions of the record may have been created with voice recognition software  Occasional wrong word or "sound a like" substitutions may have occurred due to the inherent limitations of voice recognition software  Read the chart carefully and recognize, using context, where substitutions have occurred

## 2018-07-05 NOTE — ASSESSMENT & PLAN NOTE
Symptoms do not seen to be related to thyroid dysfunction - for now will repeat tsh and also check free t4 ,TPO and TG AB     Further workup and management will depend on the labs